# Patient Record
Sex: FEMALE | Race: WHITE | NOT HISPANIC OR LATINO | Employment: FULL TIME | URBAN - METROPOLITAN AREA
[De-identification: names, ages, dates, MRNs, and addresses within clinical notes are randomized per-mention and may not be internally consistent; named-entity substitution may affect disease eponyms.]

---

## 2017-02-01 RX ORDER — ATORVASTATIN CALCIUM 10 MG/1
10 TABLET, FILM COATED ORAL
Qty: 90 TAB | Refills: 0 | Status: SHIPPED | OUTPATIENT
Start: 2017-02-01 | End: 2017-05-02 | Stop reason: SDUPTHER

## 2017-02-01 NOTE — TELEPHONE ENCOUNTER
Was the patient seen in the last year in this department? Yes     Does patient have an active prescription for medications requested? No     Received Request Via: Patient      Patient changed pharmacy to [facility]

## 2017-04-10 ENCOUNTER — OFFICE VISIT (OUTPATIENT)
Dept: URGENT CARE | Facility: PHYSICIAN GROUP | Age: 67
End: 2017-04-10
Payer: COMMERCIAL

## 2017-04-10 VITALS
TEMPERATURE: 97.3 F | OXYGEN SATURATION: 97 % | HEIGHT: 62 IN | HEART RATE: 96 BPM | WEIGHT: 155 LBS | SYSTOLIC BLOOD PRESSURE: 152 MMHG | BODY MASS INDEX: 28.52 KG/M2 | DIASTOLIC BLOOD PRESSURE: 84 MMHG

## 2017-04-10 DIAGNOSIS — R09.82 POST-NASAL DRAINAGE: ICD-10-CM

## 2017-04-10 DIAGNOSIS — R05.2 SUBACUTE COUGH: ICD-10-CM

## 2017-04-10 PROCEDURE — 99214 OFFICE O/P EST MOD 30 MIN: CPT | Performed by: PHYSICIAN ASSISTANT

## 2017-04-10 RX ORDER — METHYLPREDNISOLONE 4 MG/1
TABLET ORAL
Qty: 21 TAB | Refills: 0 | Status: SHIPPED | OUTPATIENT
Start: 2017-04-10 | End: 2017-05-23

## 2017-04-10 RX ORDER — FLUTICASONE PROPIONATE 50 MCG
1 SPRAY, SUSPENSION (ML) NASAL 2 TIMES DAILY
Qty: 16 G | Refills: 0 | Status: SHIPPED | OUTPATIENT
Start: 2017-04-10 | End: 2017-05-23 | Stop reason: SDUPTHER

## 2017-04-10 NOTE — PROGRESS NOTES
Chief Complaint   Patient presents with   • Cough     cough, pnd x3wks       HISTORY OF PRESENT ILLNESS: Patient is a 67 y.o. female who presents today for 3 weeks of coughing, nasal drainage. .   She states last night seemed to take a turn for the worse.   She was getting ready for bed and she could not stop coughing and it almost made her throw up, coughing spasm that made her SOB. The cough has been dry for the most part.  She is having near constant post nasal drainage.  She does not have any URI preceding she is aware.  Never smoker or asthma.  She has not had fevers.  She has felt chilled.  She took a generic Claritin for a few days and that helped she believes but she stopped it.     Patient Active Problem List    Diagnosis Date Noted   • Post-menopausal 11/07/2016   • Tachycardia 11/07/2016   • Thyromegaly 11/07/2016   • Vitamin D insufficiency 11/07/2016   • Recurrent cold sores 11/07/2016   • HSV infection 11/07/2016   • Elevated blood pressure 11/07/2016   • Abnormal EKG 11/07/2016   • Diverticulosis of sigmoid colon 08/12/2015   • Osteopenia    • RLS (restless legs syndrome)    • Hyperlipidemia        Allergies:Penicillin g    Current Outpatient Prescriptions Ordered in [facility]   Medication Sig Dispense Refill   • atorvastatin (LIPITOR) 10 MG Tab Take 1 Tab by mouth every day. 90 Tab 0   • ropinirole (REQUIP) 2 MG tablet TAKE 1 TABLET TWICE A  Tab 0   • famciclovir (FAMVIR) 500 MG Tab Take 3 Tabs by mouth 1 time daily as needed (for onset cold sores). 30 Tab 3   • promethazine-codeine (PHENERGAN-CODEINE) 6.25-10 MG/5ML Syrup Take 5 mL by mouth 4 times a day as needed for Cough. 120 mL 0   • Calcium + D 600-200 MG-UNIT TABS Take 2 Tabs by mouth every day.       No current [facility]-ordered facility-administered medications on file.       Past Medical History   Diagnosis Date   • Osteopenia    • Hyperlipidemia    • RLS (restless legs syndrome)        Social History   Substance Use Topics   • Smoking status:  "Never Smoker    • Smokeless tobacco: Never Used   • Alcohol Use: 3.5 oz/week     7 Glasses of wine per week       Family Status   Relation Status Death Age   • Mother     • Father     • Sister Alive    • Brother       accident   • Sister Alive      Family History   Problem Relation Age of Onset   • Hypertension Mother    • Other Mother      dementia   • Heart Disease Father    • Heart Attack Father      MI at age 62   • Hyperlipidemia Sister    • Hypertension Sister    • Cancer Maternal Aunt    • Hypertension Sister        ROS:  Review of Systems   Constitutional: Negative for fever, chills, weight loss and malaise/fatigue.   HENT: SEE HPI  Eyes: Negative for blurred vision.   Respiratory: SEE HPI  Cardiovascular: Negative for chest pain, palpitations, orthopnea and leg swelling.   Gastrointestinal: Negative for heartburn, nausea, vomiting and abdominal pain.   All other systems reviewed and are negative.       Exam:  Blood pressure 152/84, pulse 96, temperature 36.3 °C (97.3 °F), height 1.575 m (5' 2\"), weight 70.308 kg (155 lb), SpO2 97 %.  General:  Well nourished, well developed female in NAD  Eyes: PERRLA, EOM within normal limits, no conjunctival injection, no scleral icterus, visual fields and acuity grossly intact.  Ears: Normal shape and symmetry, no tenderness, no discharge. External canals are without any significant edema or erythema. Tympanic membranes are without any inflammation, no effusion. Gross auditory acuity is intact  Nose: Symmetrical, sinuses without tenderness, pale boggy turbinates, clear drainage  Mouth: reasonable hygiene, no erythema exudates or tonsillar enlargement.  Copious clear post nasal drainage.   Neck: no masses, range of motion within normal limits, no tracheal deviation. No lymphadenopathy  Pulmonary: Normal respiratory effort, no wheezes, crackles, or rhonchi.  Cardiovascular: regular rate and rhythm without murmurs, rubs, or gallops.  Skin: No visible " rashes or lesion. Warm, pink, dry.   Extremities: no clubbing, cyanosis, or edema.  Neuro: A&O x 3. Speech normal/clear.  Normal gait.         Assessment/Plan:  1. Post-nasal drainage  fluticasone (FLONASE ALLERGY RELIEF) 50 MCG/ACT nasal spray   2. Subacute cough  MethylPREDNISolone (MEDROL DOSEPAK) 4 MG Tablet Therapy Pack       -lungs CTA.   -fluids, rest emphasized.  Flonase/Allegra or similar for post nasal drainage trial.  Benadryl at bedtime if needed for sleep and additional help with drainage  -humidifier/steam inhalations as well.    -contingent Medrol prescription given to patient to fill upon meeting criteria of guidelines discussed.       Supportive care, differential diagnoses, and indications for immediate follow-up discussed with patient.   Pathogenesis of diagnosis discussed including typical length and natural progression.   Instructed to return to clinic or nearest emergency department for any change in condition, further concerns, or worsening of symptoms.  Patient states understanding of the plan of care and discharge instructions.      Federica Go PA-C

## 2017-04-10 NOTE — PROGRESS NOTES
Chief Complaint   Patient presents with   • Cough     cough, pnd x3wks       HISTORY OF PRESENT ILLNESS: Patient is a 67 y.o. female who presents today for 3 weeks of     Patient Active Problem List    Diagnosis Date Noted   • Post-menopausal 2016   • Tachycardia 2016   • Thyromegaly 2016   • Vitamin D insufficiency 2016   • Recurrent cold sores 2016   • HSV infection 2016   • Elevated blood pressure 2016   • Abnormal EKG 2016   • Diverticulosis of sigmoid colon 2015   • Osteopenia    • RLS (restless legs syndrome)    • Hyperlipidemia        Allergies:Penicillin g    Current Outpatient Prescriptions Ordered in [facility]   Medication Sig Dispense Refill   • atorvastatin (LIPITOR) 10 MG Tab Take 1 Tab by mouth every day. 90 Tab 0   • ropinirole (REQUIP) 2 MG tablet TAKE 1 TABLET TWICE A  Tab 0   • famciclovir (FAMVIR) 500 MG Tab Take 3 Tabs by mouth 1 time daily as needed (for onset cold sores). 30 Tab 3   • promethazine-codeine (PHENERGAN-CODEINE) 6.25-10 MG/5ML Syrup Take 5 mL by mouth 4 times a day as needed for Cough. 120 mL 0   • Calcium + D 600-200 MG-UNIT TABS Take 2 Tabs by mouth every day.       No current [facility]-ordered facility-administered medications on file.       Past Medical History   Diagnosis Date   • Osteopenia    • Hyperlipidemia    • RLS (restless legs syndrome)        Social History   Substance Use Topics   • Smoking status: Never Smoker    • Smokeless tobacco: Never Used   • Alcohol Use: 3.5 oz/week     7 Glasses of wine per week       Family Status   Relation Status Death Age   • Mother     • Father     • Sister Alive    • Brother       accident   • Sister Alive      Family History   Problem Relation Age of Onset   • Hypertension Mother    • Other Mother      dementia   • Heart Disease Father    • Heart Attack Father      MI at age 62   • Hyperlipidemia Sister    • Hypertension Sister    • Cancer Maternal Aunt    •  "Hypertension Sister        ROS:  Review of Systems   Constitutional: Negative for fever, chills, weight loss and malaise/fatigue.   HENT: Negative for ear pain, nosebleeds, congestion, sore throat and neck pain.    Eyes: Negative for blurred vision.   Respiratory: Negative for cough, sputum production, shortness of breath and wheezing.    Cardiovascular: Negative for chest pain, palpitations, orthopnea and leg swelling.   Gastrointestinal: Negative for heartburn, nausea, vomiting and abdominal pain.   Genitourinary: Negative for dysuria, urgency and frequency.     Exam:  Blood pressure 152/84, pulse 96, temperature 36.3 °C (97.3 °F), height 1.575 m (5' 2\"), weight 70.308 kg (155 lb), SpO2 97 %.  General:  Well nourished, well developed female in NAD  Eyes: PERRLA, EOM within normal limits, no conjunctival injection, no scleral icterus, visual fields and acuity grossly intact.  Ears: Normal shape and symmetry, no tenderness, no discharge. External canals are without any significant edema or erythema. Tympanic membranes are without any inflammation, no effusion. Gross auditory acuity is intact  Nose: Symmetrical, sinuses without tenderness, no discharge.   Mouth: reasonable hygiene, no erythema exudates or tonsillar enlargement.  Neck: no masses, range of motion within normal limits, no tracheal deviation. No lymphadenopathy  Pulmonary: Normal respiratory effort, no wheezes, crackles, or rhonchi.  Cardiovascular: regular rate and rhythm without murmurs, rubs, or gallops.  Abdomen: Soft, nontender, nondistended. Normal bowel sounds. No hepatosplenomegaly or masses, or hernias. No rebound or guarding.  Skin: No visible rashes or lesion. Warm, pink, dry.   Extremities: no clubbing, cyanosis, or edema.  Neuro: A&O x 3. Speech normal/clear.  Normal gait.         Assessment/Plan:  No diagnosis found.               Supportive care, differential diagnoses, and indications for immediate follow-up discussed with patient. "   Pathogenesis of diagnosis discussed including typical length and natural progression.   Instructed to return to clinic or nearest emergency department for any change in condition, further concerns, or worsening of symptoms.  Patient states understanding of the plan of care and discharge instructions.  Instructed to make an appointment, for follow up, with their primary care provider.      Federica Go PA-C

## 2017-04-10 NOTE — MR AVS SNAPSHOT
"Mis Scott   4/10/2017 12:45 PM   Office Visit   MRN: [id number]    Department:  [facility]   Dept Phone:  [phone]    Description:  Female : 1950   Provider:  Federica Go PA-C           Reason for Visit     Cough cough, pnd x3wks      Allergies as of 4/10/2017     Allergen Noted Reactions    Penicillin G 10/01/2009         You were diagnosed with     Post-nasal drainage   [256906]       Subacute cough   [757590]         Vital Signs     Blood Pressure Pulse Temperature Height Weight Body Mass Index    152/84 mmHg 96 36.3 °C (97.3 °F) 1.575 m (5' 2\") 70.308 kg (155 lb) 28.34 kg/m2    Oxygen Saturation Smoking Status                97% Never Smoker           Basic Information     Date Of Birth Sex Race Ethnicity Preferred Language    1950 Female White Non- English      Problem List              ICD-10-CM Priority Class Noted - Resolved    Osteopenia M85.80   Unknown - Present    RLS (restless legs syndrome) G25.81   Unknown - Present    Hyperlipidemia E78.5   Unknown - Present    Diverticulosis of sigmoid colon K57.30   2015 - Present    Post-menopausal Z78.0   2016 - Present    Tachycardia R00.0   2016 - Present    Thyromegaly E04.9   2016 - Present    Vitamin D insufficiency E55.9   2016 - Present    Recurrent cold sores B00.1   2016 - Present    HSV infection B00.9   2016 - Present    Elevated blood pressure I10   2016 - Present    Abnormal EKG R94.31   2016 - Present      Health Maintenance        Date Due Completion Dates    IMM ZOSTER VACCINE 3/5/2008 ---    PAP SMEAR 10/11/2013 10/11/2010, 10/1/2009    MAMMOGRAM 2016, 2013, 2011, 10/14/2010, 10/13/2009, 10/13/2009, 2008, 2008    BONE DENSITY 2021, 2013, 2008    IMM DTaP/Tdap/Td Vaccine (3 - Td) 2023, 3/25/2003    COLONOSCOPY 2023 (Prv Comp)    Override on 2013: Previously " completed            Current Immunizations     13-VALENT PCV PREVNAR 11/7/2016    Pneumococcal polysaccharide vaccine (PPSV-23) 6/11/2013  8:46 AM    Tdap Vaccine 6/11/2013  8:47 AM, 3/25/2003      Below and/or attached are the medications your provider expects you to take. Review all of your home medications and newly ordered medications with your provider and/or pharmacist. Follow medication instructions as directed by your provider and/or pharmacist. Please keep your medication list with you and share with your provider. Update the information when medications are discontinued, doses are changed, or new medications (including over-the-counter products) are added; and carry medication information at all times in the event of emergency situations     Allergies:  PENICILLIN G - (reactions not documented)               Medications  Valid as of: April 10, 2017 -  1:13 PM    Generic Name Brand Name Tablet Size Instructions for use    Atorvastatin Calcium (Tab) LIPITOR 10 MG Take 1 Tab by mouth every day.        Calcium Carbonate-Vitamin D (Tab) Calcium + D 600-200 MG-UNIT Take 2 Tabs by mouth every day.        Famciclovir (Tab) FAMVIR 500 MG Take 3 Tabs by mouth 1 time daily as needed (for onset cold sores).        Fluticasone Propionate (Suspension) FLONASE 50 MCG/ACT Spray 1 Spray in nose 2 times a day.        MethylPREDNISolone (Tablet Therapy Pack) MEDROL DOSEPAK 4 MG Take as directed        Promethazine-Codeine (Syrup) PHENERGAN-CODEINE 6.25-10 MG/5ML Take 5 mL by mouth 4 times a day as needed for Cough.        ROPINIRole HCl (Tab) REQUIP 2 MG TAKE 1 TABLET TWICE A DAY        .                 Medicines prescribed today were sent to:     [facility] #[id number] - [place] HERNANDEZ - [address] AT [address] [address]    Phone: [phone] Fax: [phone]    Open 24 Hours?: No    [facility] - [place] - [address] AT PORTAL TO REGISTERED  [facility]    [address] [facility] [address]    Phone: [phone] Fax: [phone]    Open 24 Hours?: No    [facility] - [place], [place] - [address]    [address] [facility] [address]    Phone: [phone] Fax: [phone]    Open 24 Hours?: No      Medication refill instructions:       If your prescription bottle indicates you have medication refills left, it is not necessary to call your provider’s office. Please contact your pharmacy and they will refill your medication.    If your prescription bottle indicates you do not have any refills left, you may request refills at any time through one of the following ways: The online [employer] system (except Urgent Care), by calling your provider’s office, or by asking your pharmacy to contact your provider’s office with a refill request. Medication refills are processed only during regular business hours and may not be available until the next business day. Your provider may request additional information or to have a follow-up visit with you prior to refilling your medication.   *Please Note: Medication refills are assigned a new Rx number when refilled electronically. Your pharmacy may indicate that no refills were authorized even though a new prescription for the same medication is available at the pharmacy. Please request the medicine by name with the pharmacy before contacting your provider for a refill.           [employer]me Access Code: Activation code not generated  Current [employer] Status: Active

## 2017-05-02 RX ORDER — ATORVASTATIN CALCIUM 10 MG/1
10 TABLET, FILM COATED ORAL
Qty: 30 TAB | Refills: 0 | Status: SHIPPED | OUTPATIENT
Start: 2017-05-02 | End: 2017-05-23 | Stop reason: SDUPTHER

## 2017-05-02 NOTE — TELEPHONE ENCOUNTER
Was the patient seen in the last year in this department? No   8/2015  Does patient have an active prescription for medications requested? No     Received Request Via: Patient

## 2017-05-22 ENCOUNTER — APPOINTMENT (OUTPATIENT)
Dept: MEDICAL GROUP | Facility: LAB | Age: 67
End: 2017-05-22
Payer: COMMERCIAL

## 2017-05-23 ENCOUNTER — OFFICE VISIT (OUTPATIENT)
Dept: MEDICAL GROUP | Facility: LAB | Age: 67
End: 2017-05-23
Payer: COMMERCIAL

## 2017-05-23 VITALS
TEMPERATURE: 98.2 F | RESPIRATION RATE: 16 BRPM | HEART RATE: 100 BPM | BODY MASS INDEX: 28.52 KG/M2 | WEIGHT: 155 LBS | SYSTOLIC BLOOD PRESSURE: 132 MMHG | HEIGHT: 62 IN | OXYGEN SATURATION: 96 % | DIASTOLIC BLOOD PRESSURE: 86 MMHG

## 2017-05-23 DIAGNOSIS — E78.2 MIXED HYPERLIPIDEMIA: ICD-10-CM

## 2017-05-23 DIAGNOSIS — Z91.09 ENVIRONMENTAL ALLERGIES: ICD-10-CM

## 2017-05-23 DIAGNOSIS — E55.9 VITAMIN D INSUFFICIENCY: ICD-10-CM

## 2017-05-23 DIAGNOSIS — G25.81 RLS (RESTLESS LEGS SYNDROME): ICD-10-CM

## 2017-05-23 DIAGNOSIS — R09.82 POST-NASAL DRAINAGE: ICD-10-CM

## 2017-05-23 PROCEDURE — 99214 OFFICE O/P EST MOD 30 MIN: CPT | Performed by: FAMILY MEDICINE

## 2017-05-23 RX ORDER — ROPINIROLE 2 MG/1
TABLET, FILM COATED ORAL
Qty: 90 TAB | Refills: 3 | Status: SHIPPED | OUTPATIENT
Start: 2017-05-23 | End: 2017-05-23 | Stop reason: SDUPTHER

## 2017-05-23 RX ORDER — ATORVASTATIN CALCIUM 10 MG/1
10 TABLET, FILM COATED ORAL
Qty: 90 TAB | Refills: 3 | Status: SHIPPED | OUTPATIENT
Start: 2017-05-23 | End: 2017-05-23 | Stop reason: SDUPTHER

## 2017-05-23 RX ORDER — ROPINIROLE 2 MG/1
TABLET, FILM COATED ORAL
Qty: 90 TAB | Refills: 3 | Status: SHIPPED | OUTPATIENT
Start: 2017-05-23

## 2017-05-23 RX ORDER — ATORVASTATIN CALCIUM 10 MG/1
10 TABLET, FILM COATED ORAL
Qty: 90 TAB | Refills: 3 | Status: SHIPPED | OUTPATIENT
Start: 2017-05-23

## 2017-05-23 RX ORDER — FLUTICASONE PROPIONATE 50 MCG
1 SPRAY, SUSPENSION (ML) NASAL 2 TIMES DAILY
Qty: 16 G | Refills: 3 | Status: SHIPPED | OUTPATIENT
Start: 2017-05-23

## 2017-05-23 ASSESSMENT — PATIENT HEALTH QUESTIONNAIRE - PHQ9: CLINICAL INTERPRETATION OF PHQ2 SCORE: 0

## 2017-05-23 NOTE — PROGRESS NOTES
Chief Complaint   Patient presents with   • Medication Refill     pending in orders       HISTORY OF PRESENT ILLNESS: Patient is a 67 y.o. female established patient who presents today for medication follow up     Having some post nasal drip   No CP, headache, SOB     Patient Active Problem List    Diagnosis Date Noted   • Post-menopausal 11/07/2016   • Tachycardia 11/07/2016   • Thyromegaly 11/07/2016   • Vitamin D insufficiency  This is chronic. Taking vitamin d bid  11/07/2016   • Recurrent cold sores  Taking PRN famvir  11/07/2016   • HSV infection 11/07/2016   • Elevated blood pressure 11/07/2016   • Abnormal EKG 11/07/2016   • Diverticulosis of sigmoid colon 08/12/2015   • Osteopenia    • RLS (restless legs syndrome)  This is chronic. She is taking requip only at night     • Hyperlipidemia  This is chronic. She is taking lipitor 10 mg once daily   Had labs done in 11/2016          Allergies:Penicillin g      Current outpatient prescriptions:   •  atorvastatin (LIPITOR) 10 MG Tab, Take 1 Tab by mouth every day., Disp: 30 Tab, Rfl: 0  •  famciclovir (FAMVIR) 500 MG Tab, Take 3 Tabs by mouth 1 time daily as needed (for onset cold sores)., Disp: 30 Tab, Rfl: 3  •  ropinirole (REQUIP) 2 MG tablet, TAKE 1 TABLET TWICE A DAY, Disp: 180 Tab, Rfl: 0  •  Calcium + D 600-200 MG-UNIT TABS, Take 2 Tabs by mouth every day., Disp: , Rfl:       Social History   Substance Use Topics   • Smoking status: Never Smoker    • Smokeless tobacco: Never Used   • Alcohol Use: 3.5 oz/week     7 Glasses of wine per week       Social History     Social History Narrative       Family History   Problem Relation Age of Onset   • Hypertension Mother    • Other Mother      dementia   • Heart Disease Father    • Heart Attack Father      MI at age 62   • Hyperlipidemia Sister    • Hypertension Sister    • Cancer Maternal Aunt    • Hypertension Sister        ROS:      Exam:    Blood pressure 132/86, pulse 100, temperature 36.8 °C (98.2 °F), resp.  "rate 16, height 1.575 m (5' 2\"), weight 70.308 kg (155 lb), SpO2 96 %.    Physical Exam   Constitutional:  Appears well-developed and well-nourished. Is active and cooperative.  Non-toxic appearance.   Head: Normocephalic and atraumatic.   Right Ear: External ear normal. Left Ear: External ear normal.   Nose: +  mucosal edema with rhinorrhea present.   OP without erythema   Eyes: Conjunctivae, EOM and lids are normal. No scleral icterus.   Cardiovascular: Normal rate, regular rhythm and normal heart sounds.    Pulmonary/Chest: Effort normal and breath sounds normal.    Neurological: Alert. No tremor. No display of seizure activity. Coordination and gait normal.   Skin: Skin is warm and dry. Patient is not diaphoretic.   Psychiatric: patient has a normal mood and affect; speech is normal and behavior is normal.    No visits with results within 6 Month(s) from this visit.  Latest known visit with results is:    Hospital Outpatient Visit on 11/18/2016   Component Date Value Ref Range Status   • Sodium 11/18/2016 139  135 - 145 mmol/L Final   • Potassium 11/18/2016 4.3  3.6 - 5.5 mmol/L Final   • Chloride 11/18/2016 106  96 - 112 mmol/L Final   • Co2 11/18/2016 26  20 - 33 mmol/L Final   • Anion Gap 11/18/2016 7.0  0.0 - 11.9 Final   • Glucose 11/18/2016 83  65 - 99 mg/dL Final   • Bun 11/18/2016 16  8 - 22 mg/dL Final   • Creatinine 11/18/2016 0.79  0.50 - 1.40 mg/dL Final   • Calcium 11/18/2016 9.8  8.5 - 10.5 mg/dL Final   • AST(SGOT) 11/18/2016 21  12 - 45 U/L Final   • ALT(SGPT) 11/18/2016 18  2 - 50 U/L Final   • Alkaline Phosphatase 11/18/2016 82  30 - 99 U/L Final   • Total Bilirubin 11/18/2016 0.4  0.1 - 1.5 mg/dL Final   • Albumin 11/18/2016 4.5  3.2 - 4.9 g/dL Final   • Total Protein 11/18/2016 7.7  6.0 - 8.2 g/dL Final   • Globulin 11/18/2016 3.2  1.9 - 3.5 g/dL Final   • A-G Ratio 11/18/2016 1.4   Final   • Cholesterol,Tot 11/18/2016 174  100 - 199 mg/dL Final   • Triglycerides 11/18/2016 117  0 - 149 mg/dL " Final   • HDL 11/18/2016 52  >=40 mg/dL Final   • LDL 11/18/2016 99  <100 mg/dL Final   • TSH 11/18/2016 1.850  0.300 - 3.700 uIU/mL Final   • Free T-4 11/18/2016 0.80  0.53 - 1.43 ng/dL Final   • WBC 11/18/2016 4.7* 4.8 - 10.8 K/uL Final   • RBC 11/18/2016 4.73  4.20 - 5.40 M/uL Final   • Hemoglobin 11/18/2016 14.8  12.0 - 16.0 g/dL Final   • Hematocrit 11/18/2016 46.7  37.0 - 47.0 % Final   • MCV 11/18/2016 98.7* 81.4 - 97.8 fL Final   • MCH 11/18/2016 31.3  27.0 - 33.0 pg Final   • MCHC 11/18/2016 31.7* 33.6 - 35.0 g/dL Final   • RDW 11/18/2016 44.0  35.9 - 50.0 fL Final   • Platelet Count 11/18/2016 284  164 - 446 K/uL Final   • MPV 11/18/2016 10.0  9.0 - 12.9 fL Final   • Neutrophils-Polys 11/18/2016 58.70  44.00 - 72.00 % Final   • Lymphocytes 11/18/2016 31.40  22.00 - 41.00 % Final   • Monocytes 11/18/2016 7.40  0.00 - 13.40 % Final   • Eosinophils 11/18/2016 1.50  0.00 - 6.90 % Final   • Basophils 11/18/2016 0.80  0.00 - 1.80 % Final   • Immature Granulocytes 11/18/2016 0.20  0.00 - 0.90 % Final   • Nucleated RBC 11/18/2016 0.00   Final   • Neutrophils (Absolute) 11/18/2016 2.76  2.00 - 7.15 K/uL Final    Includes immature neutrophils, if present.   • Lymphs (Absolute) 11/18/2016 1.48  1.00 - 4.80 K/uL Final   • Monos (Absolute) 11/18/2016 0.35  0.00 - 0.85 K/uL Final   • Eos (Absolute) 11/18/2016 0.07  0.00 - 0.51 K/uL Final   • Baso (Absolute) 11/18/2016 0.04  0.00 - 0.12 K/uL Final   • Immature Granulocytes (abs) 11/18/2016 0.01  0.00 - 0.11 K/uL Final   • NRBC (Absolute) 11/18/2016 0.00   Final   • 25-Hydroxy   Vitamin D 25 11/18/2016 37  30 - 100 ng/mL Final    Comment: Adult Ranges:   <20 ng/mL - Deficiency  20-29 ng/mL - Insufficiency   ng/mL - Sufficiency  The [employer] Centaur Vitamin D Assay is standardized to the  [facility] reference measurement procedures, a  reference method for the Vitamin D Standardization Program  (VDSP).  The VDSP aligns patient results among 25 (OH)  Vitamin D  methods.     • Creatinine, Urine 11/18/2016 73.60   Final   • Microalbumin, Urine Random 11/18/2016 <0.7   Final   • Micro Alb Creat Ratio 11/18/2016 see below  0 - 30 mg/g Final    Comment: Unable to calculate the microalbumin/creatinine ratio due to  the microalbumin result or the urine creatinine result being  outside the measurement range of the analyzer.     • GFR If  11/18/2016 >60  >60 mL/min/1.73 m 2 Final   • GFR If Non  11/18/2016 >60  >60 mL/min/1.73 m 2 Final       Assessment/Plan:     1. RLS (restless legs syndrome)  Refilled medication   - ropinirole (REQUIP) 2 MG tablet; TAKE 1 TABLET at night  Dispense: 90 Tab; Refill: 3    2. Post-nasal drainage  Recommend flonase one spray EN bid   - fluticasone (FLONASE ALLERGY RELIEF) 50 MCG/ACT nasal spray; Spray 1 Spray in nose 2 times a day.  Dispense: 16 g; Refill: 3    3. Vitamin D insufficiency  Continue vit d supplementation     4. Mixed hyperlipidemia  Stable. Refilled medication   - atorvastatin (LIPITOR) 10 MG Tab; Take 1 Tab by mouth every day.  Dispense: 90 Tab; Refill: 3    5. Environmental allergies  Flonase and OTC antihistamine   - fluticasone (FLONASE ALLERGY RELIEF) 50 MCG/ACT nasal spray; Spray 1 Spray in nose 2 times a day.  Dispense: 16 g; Refill: 3      Please note that this dictation was created using voice recognition software. I have made every reasonable attempt to correct obvious errors, but I expect that there are errors of grammar and possibly content that I did not discover before finalizing the note.

## 2017-05-23 NOTE — MR AVS SNAPSHOT
"        Mis Larson Tyler   2017 2:20 PM   Office Visit   MRN: [id number]    Department:  [facility]   Dept Phone:  [phone]    Description:  Female : 1950   Provider:  Annette Moreno M.D.           Reason for Visit     Medication Refill pending in orders      Allergies as of 2017     Allergen Noted Reactions    Penicillin G 10/01/2009         You were diagnosed with     RLS (restless legs syndrome)   [145097]       Post-nasal drainage   [567482]       Vitamin D insufficiency   [111174]       Mixed hyperlipidemia   [272.2.ICD-9-CM]       Environmental allergies   [143074]         Vital Signs     Blood Pressure Pulse Temperature Respirations Height Weight    132/86 mmHg 100 36.8 °C (98.2 °F) 16 1.575 m (5' 2\") 70.308 kg (155 lb)    Body Mass Index Oxygen Saturation Smoking Status             28.34 kg/m2 96% Never Smoker          Basic Information     Date Of Birth Sex Race Ethnicity Preferred Language    1950 Female White Non- English      Problem List              ICD-10-CM Priority Class Noted - Resolved    Osteopenia M85.80   Unknown - Present    RLS (restless legs syndrome) G25.81   Unknown - Present    Hyperlipidemia E78.5   Unknown - Present    Diverticulosis of sigmoid colon K57.30   2015 - Present    Post-menopausal Z78.0   2016 - Present    Tachycardia R00.0   2016 - Present    Thyromegaly E01.0   2016 - Present    Vitamin D insufficiency E55.9   2016 - Present    Recurrent cold sores B00.1   2016 - Present    HSV infection B00.9   2016 - Present    Elevated blood pressure [id number]   2016 - Present    Abnormal EKG R94.31   2016 - Present    Environmental allergies Z91.09   2017 - Present      Health Maintenance        Date Due Completion Dates    IMM ZOSTER VACCINE 3/5/2008 ---    PAP SMEAR 10/11/2013 10/11/2010, 10/1/2009    MAMMOGRAM 2016, 2013, 2011, 10/14/2010, 10/13/2009, 10/13/2009, " 9/26/2008, 9/26/2008    BONE DENSITY 11/28/2021 11/28/2016, 6/11/2013, 9/26/2008    IMM DTaP/Tdap/Td Vaccine (3 - Td) 6/11/2023 6/11/2013, 3/25/2003    COLONOSCOPY 9/9/2023 9/9/2013 (Prv Comp)    Override on 9/9/2013: Previously completed            Current Immunizations     13-VALENT PCV PREVNAR 11/7/2016    Pneumococcal polysaccharide vaccine (PPSV-23) 6/11/2013  8:46 AM    Tdap Vaccine 6/11/2013  8:47 AM, 3/25/2003      Below and/or attached are the medications your provider expects you to take. Review all of your home medications and newly ordered medications with your provider and/or pharmacist. Follow medication instructions as directed by your provider and/or pharmacist. Please keep your medication list with you and share with your provider. Update the information when medications are discontinued, doses are changed, or new medications (including over-the-counter products) are added; and carry medication information at all times in the event of emergency situations     Allergies:  PENICILLIN G - (reactions not documented)               Medications  Valid as of: May 23, 2017 -  3:12 PM    Generic Name Brand Name Tablet Size Instructions for use    Atorvastatin Calcium (Tab) LIPITOR 10 MG Take 1 Tab by mouth every day.        Calcium Carbonate-Vitamin D (Tab) Calcium + D 600-200 MG-UNIT Take 2 Tabs by mouth every day.        Famciclovir (Tab) FAMVIR 500 MG Take 3 Tabs by mouth 1 time daily as needed (for onset cold sores).        Fluticasone Propionate (Suspension) FLONASE 50 MCG/ACT Spray 1 Spray in nose 2 times a day.        ROPINIRole HCl (Tab) REQUIP 2 MG TAKE 1 TABLET at night        .                 Medicines prescribed today were sent to:     [id number] - [place] [address] [place] [address]    Phone: [phone] Fax: [phone]    Open 24 Hours?: No    [facility] - [place], [place] - [address] AT PORTAL  TO REGISTERED [facility] SITES    [address] [facility] [address]    Phone: [phone] Fax: [phone]    Open 24 Hours?: No    [facility] - [place], [place] - [address] [facility]    [address] [address]    Phone: [phone] Fax: [phone]    Open 24 Hours?: No      Medication refill instructions:       If your prescription bottle indicates you have medication refills left, it is not necessary to call your provider’s office. Please contact your pharmacy and they will refill your medication.    If your prescription bottle indicates you do not have any refills left, you may request refills at any time through one of the following ways: The online [employer] system (except Urgent Care), by calling your provider’s office, or by asking your pharmacy to contact your provider’s office with a refill request. Medication refills are processed only during regular business hours and may not be available until the next business day. Your provider may request additional information or to have a follow-up visit with you prior to refilling your medication.   *Please Note: Medication refills are assigned a new Rx number when refilled electronically. Your pharmacy may indicate that no refills were authorized even though a new prescription for the same medication is available at the pharmacy. Please request the medicine by name with the pharmacy before contacting your provider for a refill.           [employer] Access Code: Activation code not generated  Current [employer] Status: Active

## 2017-12-06 ENCOUNTER — HOSPITAL ENCOUNTER (OUTPATIENT)
Facility: MEDICAL CENTER | Age: 67
End: 2017-12-06
Attending: FAMILY MEDICINE
Payer: COMMERCIAL

## 2017-12-06 ENCOUNTER — OFFICE VISIT (OUTPATIENT)
Dept: URGENT CARE | Facility: PHYSICIAN GROUP | Age: 67
End: 2017-12-06
Payer: COMMERCIAL

## 2017-12-06 VITALS
WEIGHT: 158 LBS | HEART RATE: 78 BPM | TEMPERATURE: 97.4 F | RESPIRATION RATE: 16 BRPM | SYSTOLIC BLOOD PRESSURE: 134 MMHG | HEIGHT: 62 IN | DIASTOLIC BLOOD PRESSURE: 76 MMHG | BODY MASS INDEX: 29.08 KG/M2 | OXYGEN SATURATION: 97 %

## 2017-12-06 DIAGNOSIS — N30.01 ACUTE CYSTITIS WITH HEMATURIA: ICD-10-CM

## 2017-12-06 LAB
APPEARANCE UR: NORMAL
BILIRUB UR STRIP-MCNC: NORMAL MG/DL
COLOR UR AUTO: NORMAL
GLUCOSE UR STRIP.AUTO-MCNC: NORMAL MG/DL
KETONES UR STRIP.AUTO-MCNC: NORMAL MG/DL
LEUKOCYTE ESTERASE UR QL STRIP.AUTO: NORMAL
NITRITE UR QL STRIP.AUTO: POSITIVE
PH UR STRIP.AUTO: 6.5 [PH] (ref 5–8)
PROT UR QL STRIP: NORMAL MG/DL
RBC UR QL AUTO: NORMAL
SP GR UR STRIP.AUTO: 1.02
UROBILINOGEN UR STRIP-MCNC: NORMAL MG/DL

## 2017-12-06 PROCEDURE — 99214 OFFICE O/P EST MOD 30 MIN: CPT | Performed by: FAMILY MEDICINE

## 2017-12-06 PROCEDURE — 87186 SC STD MICRODIL/AGAR DIL: CPT

## 2017-12-06 PROCEDURE — 81002 URINALYSIS NONAUTO W/O SCOPE: CPT | Performed by: FAMILY MEDICINE

## 2017-12-06 PROCEDURE — 87077 CULTURE AEROBIC IDENTIFY: CPT

## 2017-12-06 PROCEDURE — 87086 URINE CULTURE/COLONY COUNT: CPT

## 2017-12-06 RX ORDER — SULFAMETHOXAZOLE AND TRIMETHOPRIM 800; 160 MG/1; MG/1
1 TABLET ORAL EVERY 12 HOURS
Qty: 6 TAB | Refills: 0 | Status: SHIPPED | OUTPATIENT
Start: 2017-12-06 | End: 2017-12-09

## 2017-12-06 ASSESSMENT — PAIN SCALES - GENERAL: PAINLEVEL: NO PAIN

## 2017-12-06 ASSESSMENT — ENCOUNTER SYMPTOMS
FEVER: 0
ABDOMINAL PAIN: 1
VOMITING: 0
NAUSEA: 0
CHILLS: 0

## 2017-12-06 NOTE — PATIENT INSTRUCTIONS
Urinary Tract Infection  A urinary tract infection (UTI) can occur any place along the urinary tract. The tract includes the kidneys, ureters, bladder, and urethra. A type of germ called bacteria often causes a UTI. UTIs are often helped with antibiotic medicine.   HOME CARE   · If given, take antibiotics as told by your doctor. Finish them even if you start to feel better.  · Drink enough fluids to keep your pee (urine) clear or pale yellow.  · Avoid tea, drinks with caffeine, and bubbly (carbonated) drinks.  · Pee often. Avoid holding your pee in for a long time.  · Pee before and after having sex (intercourse).  · Wipe from front to back after you poop (bowel movement) if you are a woman. Use each tissue only once.  GET HELP RIGHT AWAY IF:   · You have back pain.  · You have lower belly (abdominal) pain.  · You have chills.  · You feel sick to your stomach (nauseous).  · You throw up (vomit).  · Your burning or discomfort with peeing does not go away.  · You have a fever.  · Your symptoms are not better in 3 days.  MAKE SURE YOU:   · Understand these instructions.  · Will watch your condition.  · Will get help right away if you are not doing well or get worse.     This information is not intended to replace advice given to you by your health care provider. Make sure you discuss any questions you have with your health care provider.     Document Released: 06/05/2009 Document Revised: 01/08/2016 Document Reviewed: 07/18/2013  [employer] Patient Education ©2016 [employer]

## 2017-12-06 NOTE — PROGRESS NOTES
"Subjective:   Mis Scott is a 67 y.o. female who presents for UTI (x 2 days )        UTI   This is a new problem. The current episode started in the past 7 days. The problem occurs constantly. The problem has been gradually worsening. Associated symptoms include abdominal pain and urinary symptoms. Pertinent negatives include no chills, fever, nausea or vomiting.     Review of Systems   Constitutional: Negative for chills and fever.   Gastrointestinal: Positive for abdominal pain. Negative for nausea and vomiting.     Allergies   Allergen Reactions   • Penicillin G       Objective:   /76   Pulse 78   Temp 36.3 °C (97.4 °F)   Resp 16   Ht 1.575 m (5' 2\")   Wt 71.7 kg (158 lb)   SpO2 97%   BMI 28.90 kg/m²   Physical Exam   Constitutional: She is oriented to person, place, and time. She appears well-developed and well-nourished. No distress.   HENT:   Head: Normocephalic and atraumatic.   Eyes: Conjunctivae and EOM are normal. Pupils are equal, round, and reactive to light.   Cardiovascular: Normal rate, regular rhythm, normal heart sounds and intact distal pulses.    No murmur heard.  Pulmonary/Chest: Effort normal and breath sounds normal. No respiratory distress.   Abdominal: Soft. Bowel sounds are normal. She exhibits no distension. There is no tenderness. There is no CVA tenderness.   Musculoskeletal: Normal range of motion.   Neurological: She is alert and oriented to person, place, and time. She has normal reflexes. No sensory deficit.   Skin: Skin is warm and dry.   Psychiatric: She has a normal mood and affect. Her behavior is normal.   Vitals reviewed.        Assessment/Plan:   Assessment    1. Acute cystitis with hematuria  Differential diagnosis, natural history, supportive care, and indications for immediate follow-up discussed.   - POCT Urinalysis  - Urine Culture; Future  - sulfamethoxazole-trimethoprim (BACTRIM DS) 800-160 MG tablet; Take 1 Tab by mouth every 12 hours for 3 days. "  Dispense: 6 Tab; Refill: 0

## 2017-12-08 LAB
BACTERIA UR CULT: ABNORMAL
SIGNIFICANT IND 70042: ABNORMAL
SOURCE SOURCE: ABNORMAL

## 2018-06-13 ENCOUNTER — HOSPITAL ENCOUNTER (OUTPATIENT)
Dept: LAB | Facility: MEDICAL CENTER | Age: 68
End: 2018-06-13
Attending: INTERNAL MEDICINE
Payer: COMMERCIAL

## 2018-06-13 LAB
25(OH)D3 SERPL-MCNC: 22 NG/ML (ref 30–100)
ALBUMIN SERPL BCP-MCNC: 4.4 G/DL (ref 3.2–4.9)
ALBUMIN/GLOB SERPL: 1.2 G/DL
ALP SERPL-CCNC: 62 U/L (ref 30–99)
ALT SERPL-CCNC: 24 U/L (ref 2–50)
ANION GAP SERPL CALC-SCNC: 9 MMOL/L (ref 0–11.9)
APPEARANCE UR: CLEAR
AST SERPL-CCNC: 26 U/L (ref 12–45)
BACTERIA #/AREA URNS HPF: NEGATIVE /HPF
BASOPHILS # BLD AUTO: 1.4 % (ref 0–1.8)
BASOPHILS # BLD: 0.07 K/UL (ref 0–0.12)
BILIRUB SERPL-MCNC: 0.8 MG/DL (ref 0.1–1.5)
BILIRUB UR QL STRIP.AUTO: NEGATIVE
BUN SERPL-MCNC: 14 MG/DL (ref 8–22)
CALCIUM SERPL-MCNC: 9.2 MG/DL (ref 8.5–10.5)
CHLORIDE SERPL-SCNC: 105 MMOL/L (ref 96–112)
CHOLEST SERPL-MCNC: 214 MG/DL (ref 100–199)
CO2 SERPL-SCNC: 26 MMOL/L (ref 20–33)
COLOR UR: YELLOW
CREAT SERPL-MCNC: 0.79 MG/DL (ref 0.5–1.4)
EOSINOPHIL # BLD AUTO: 0.07 K/UL (ref 0–0.51)
EOSINOPHIL NFR BLD: 1.4 % (ref 0–6.9)
EPI CELLS #/AREA URNS HPF: NEGATIVE /HPF
ERYTHROCYTE [DISTWIDTH] IN BLOOD BY AUTOMATED COUNT: 44.7 FL (ref 35.9–50)
EST. AVERAGE GLUCOSE BLD GHB EST-MCNC: 108 MG/DL
GLOBULIN SER CALC-MCNC: 3.7 G/DL (ref 1.9–3.5)
GLUCOSE SERPL-MCNC: 94 MG/DL (ref 65–99)
GLUCOSE UR STRIP.AUTO-MCNC: NEGATIVE MG/DL
HBA1C MFR BLD: 5.4 % (ref 0–5.6)
HCT VFR BLD AUTO: 43.9 % (ref 37–47)
HDLC SERPL-MCNC: 58 MG/DL
HGB BLD-MCNC: 14.5 G/DL (ref 12–16)
HYALINE CASTS #/AREA URNS LPF: NORMAL /LPF
IMM GRANULOCYTES # BLD AUTO: 0.02 K/UL (ref 0–0.11)
IMM GRANULOCYTES NFR BLD AUTO: 0.4 % (ref 0–0.9)
KETONES UR STRIP.AUTO-MCNC: NEGATIVE MG/DL
LDLC SERPL CALC-MCNC: 120 MG/DL
LEUKOCYTE ESTERASE UR QL STRIP.AUTO: ABNORMAL
LYMPHOCYTES # BLD AUTO: 1.99 K/UL (ref 1–4.8)
LYMPHOCYTES NFR BLD: 39.7 % (ref 22–41)
MCH RBC QN AUTO: 32.8 PG (ref 27–33)
MCHC RBC AUTO-ENTMCNC: 33 G/DL (ref 33.6–35)
MCV RBC AUTO: 99.3 FL (ref 81.4–97.8)
MICRO URNS: ABNORMAL
MONOCYTES # BLD AUTO: 0.46 K/UL (ref 0–0.85)
MONOCYTES NFR BLD AUTO: 9.2 % (ref 0–13.4)
NEUTROPHILS # BLD AUTO: 2.4 K/UL (ref 2–7.15)
NEUTROPHILS NFR BLD: 47.9 % (ref 44–72)
NITRITE UR QL STRIP.AUTO: NEGATIVE
NRBC # BLD AUTO: 0 K/UL
NRBC BLD-RTO: 0 /100 WBC
PH UR STRIP.AUTO: 6 [PH]
PLATELET # BLD AUTO: 263 K/UL (ref 164–446)
PMV BLD AUTO: 9.9 FL (ref 9–12.9)
POTASSIUM SERPL-SCNC: 3.8 MMOL/L (ref 3.6–5.5)
PROT SERPL-MCNC: 8.1 G/DL (ref 6–8.2)
PROT UR QL STRIP: NEGATIVE MG/DL
RBC # BLD AUTO: 4.42 M/UL (ref 4.2–5.4)
RBC # URNS HPF: NORMAL /HPF
RBC UR QL AUTO: ABNORMAL
SODIUM SERPL-SCNC: 140 MMOL/L (ref 135–145)
SP GR UR STRIP.AUTO: 1.01
TRIGL SERPL-MCNC: 180 MG/DL (ref 0–149)
UROBILINOGEN UR STRIP.AUTO-MCNC: 0.2 MG/DL
WBC # BLD AUTO: 5 K/UL (ref 4.8–10.8)
WBC #/AREA URNS HPF: NORMAL /HPF

## 2018-06-13 PROCEDURE — 80053 COMPREHEN METABOLIC PANEL: CPT

## 2018-06-13 PROCEDURE — 36415 COLL VENOUS BLD VENIPUNCTURE: CPT

## 2018-06-13 PROCEDURE — 81001 URINALYSIS AUTO W/SCOPE: CPT

## 2018-06-13 PROCEDURE — 82306 VITAMIN D 25 HYDROXY: CPT

## 2018-06-13 PROCEDURE — 85025 COMPLETE CBC W/AUTO DIFF WBC: CPT

## 2018-06-13 PROCEDURE — 80061 LIPID PANEL: CPT

## 2018-06-13 PROCEDURE — 83036 HEMOGLOBIN GLYCOSYLATED A1C: CPT

## 2021-01-15 DIAGNOSIS — Z23 NEED FOR VACCINATION: ICD-10-CM
